# Patient Record
Sex: FEMALE | Race: WHITE | NOT HISPANIC OR LATINO | Employment: FULL TIME | URBAN - METROPOLITAN AREA
[De-identification: names, ages, dates, MRNs, and addresses within clinical notes are randomized per-mention and may not be internally consistent; named-entity substitution may affect disease eponyms.]

---

## 2024-08-26 ENCOUNTER — OFFICE VISIT (OUTPATIENT)
Dept: URGENT CARE | Facility: CLINIC | Age: 49
End: 2024-08-26
Payer: OTHER GOVERNMENT

## 2024-08-26 ENCOUNTER — PHARMACY VISIT (OUTPATIENT)
Dept: PHARMACY | Facility: MEDICAL CENTER | Age: 49
End: 2024-08-26
Payer: COMMERCIAL

## 2024-08-26 VITALS
DIASTOLIC BLOOD PRESSURE: 72 MMHG | HEIGHT: 62 IN | SYSTOLIC BLOOD PRESSURE: 122 MMHG | RESPIRATION RATE: 14 BRPM | HEART RATE: 84 BPM | OXYGEN SATURATION: 97 % | TEMPERATURE: 98.1 F | WEIGHT: 125 LBS | BODY MASS INDEX: 23 KG/M2

## 2024-08-26 DIAGNOSIS — H10.9 BACTERIAL CONJUNCTIVITIS OF BOTH EYES: Primary | ICD-10-CM

## 2024-08-26 DIAGNOSIS — B96.89 BACTERIAL CONJUNCTIVITIS OF BOTH EYES: Primary | ICD-10-CM

## 2024-08-26 PROCEDURE — 3074F SYST BP LT 130 MM HG: CPT

## 2024-08-26 PROCEDURE — 99203 OFFICE O/P NEW LOW 30 MIN: CPT

## 2024-08-26 PROCEDURE — RXMED WILLOW AMBULATORY MEDICATION CHARGE

## 2024-08-26 PROCEDURE — 3078F DIAST BP <80 MM HG: CPT

## 2024-08-26 RX ORDER — POLYMYXIN B SULFATE AND TRIMETHOPRIM 1; 10000 MG/ML; [USP'U]/ML
1 SOLUTION OPHTHALMIC EVERY 4 HOURS
Qty: 10 ML | Refills: 0 | Status: SHIPPED | OUTPATIENT
Start: 2024-08-26 | End: 2024-09-08

## 2024-08-27 NOTE — PROGRESS NOTES
Subjective:   Cheyanne Powell is a 49 y.o. female who presents for Eye Drainage (Both eyes are red, yellow drainage coming out x 1 day )          I introduced myself to the patient and informed them that I am a Family Nurse Practitioner.    HPI:Cheyanne is a 49 year-old female who comes in today c/o both eyes are  red, irritated, thick yellow mucopurulent discharge this morning and crusting of eyelashes. Onset was started 2 days ago with the right eye, woke up with left eye also affected this morning.  Patient describes symptoms as constant. They describe the pain as none. Aggravating factors include rubbing the eye. Relieving factors include bathing the eye. Treatments tried at home include none. They describe their symptoms as moderate.  Denies anybody else at home has similar symptoms, denies any contact with anyone with pinkeye. They deny wearing contact lenses.     Review of Systems   Constitutional:  Negative for chills, fever and malaise/fatigue.   HENT:  Negative for congestion and sore throat.    Eyes:  Positive for discharge and redness. Negative for blurred vision, double vision, photophobia and pain.   Respiratory:  Negative for cough and shortness of breath.    Cardiovascular:  Negative for chest pain and palpitations.   Gastrointestinal:  Negative for nausea and vomiting.   Skin:  Negative for rash.   Neurological:  Negative for dizziness and headaches.       Medications: This patient does not have an active medication from one of the medication groupers.     Allergies: Patient has no known allergies.    Problem List: does not have a problem list on file.    Surgical History:  No past surgical history on file.    Past Social Hx:        Past Family Hx:   family history is not on file.     Problem list, medications, and allergies reviewed by myself today in Epic.   I have documented what I find to be significant in regards to past medical, social, family and surgical history  in my HPI or under  "PMH/PSH/FH review section, otherwise it is noncontributory     Objective:     /72 (BP Location: Left arm, Patient Position: Sitting, BP Cuff Size: Adult)   Pulse 84   Temp 36.7 °C (98.1 °F) (Temporal)   Resp 14   Ht 1.575 m (5' 2\")   Wt 56.7 kg (125 lb)   SpO2 97%   BMI 22.86 kg/m²     During this visit, appropriate PPE was worn, and hand hygiene was performed.    Physical Exam  Vitals reviewed.   Constitutional:       General: She is not in acute distress.     Appearance: Normal appearance. She is not ill-appearing or toxic-appearing.   HENT:      Head: Normocephalic and atraumatic.      Right Ear: External ear normal.      Left Ear: External ear normal.      Nose: Nose normal.      Mouth/Throat:      Mouth: Mucous membranes are moist.   Eyes:      General: No scleral icterus.        Right eye: Discharge present.         Left eye: Discharge present.     Extraocular Movements: Extraocular movements intact.      Pupils: Pupils are equal, round, and reactive to light.      Comments: Bilateral eye exam shows no penetrative injury or foreign object noted.  There is injection and erythema of the bilateral conjunctivae present which is confined to the conjunctival layer as evidenced by gently moving the conjunctival layer with a moistened Q-tip.  There are traces of mucopurulent drainage visible with some crusting of eye lashes.  No signs of uveitis, hyphema, proptosis, or chemosis.  EOM intact without pain, no periorbital swelling or cellulitis.  Visual acuity is grossly intact. CN II - IV and CN VI grossly intact.    Cardiovascular:      Rate and Rhythm: Normal rate.   Pulmonary:      Effort: Pulmonary effort is normal.   Abdominal:      General: There is no distension.   Musculoskeletal:         General: Normal range of motion.      Cervical back: Normal range of motion. No rigidity.   Lymphadenopathy:      Cervical: No cervical adenopathy.   Skin:     General: Skin is warm and dry.   Neurological:      " General: No focal deficit present.      Mental Status: She is alert and oriented to person, place, and time. Mental status is at baseline.      Cranial Nerves: No cranial nerve deficit.   Psychiatric:         Mood and Affect: Mood normal.         Behavior: Behavior normal.         Thought Content: Thought content normal.         Judgment: Judgment normal.         Assessment/Plan:     Diagnosis and associated orders:     1. Bacterial conjunctivitis of both eyes  polymixin-trimethoprim (POLYTRIM) 26927-3.1 UNIT/ML-% Solution         Comments/MDM:     1. Bacterial conjunctivitis of both eyes    Discussed with patient that conjunctivitis can be bacterial, viral or allergic, and treatment approach is different for bacterial versus viral or allergic.  Discussed that her presentation and symptoms are consistent with bacterial conjunctivitis and this is treated with antibiotic drops.   To ease discomfort, apply a cool, clean wash cloth to your eye for 10 to 20 minutes, 3 to 4 times a day.  Gently wipe away any drainage from the eye with a warm, wet washcloth or a cotton ball.  Wash your hands often with soap and use paper towels to dry.  Do not share towels or washcloths. This may spread the infection.  Change or wash your pillowcase every day.  You should not use eye make-up until the infection is gone.  Stop using contacts lenses. Ask your eye professional how to sterilize or replace them before using again. This depends on the type of contact lenses used.  Do not touch the edge of the eyelid with the eye drop bottle or ointment tube when applying medications to the affected eye. This will stop you from spreading the infection to the other eye or to others.  Report any problems that may be related to the prescribed medicine should they develop.   Discussed red flags and when to return to urgent care/see PCP.   Strict ER precautions discussed for any eye pain, severe headache, swelling/warmth/pain/redness of the orbit  around the eye, vision changes, fever, chills, nausea, vomiting, lethargy.    - polymixin-trimethoprim (POLYTRIM) 24946-6.1 UNIT/ML-% Solution; Administer 1 Drop into both eyes every 4 hours for 10 days.  Dispense: 10 mL; Refill: 0         Pt is clinically stable at today's acute urgent care visit. Vital signs are normal and reassuring.  No acute distress noted. Appropriate for outpatient management at this time.        I personally reviewed prior external notes and test results pertinent to today's visit.  I have independently reviewed and interpreted all diagnostics ordered during this urgent care acute visit.        Please note that this dictation was created using voice recognition software. I have made a reasonable attempt to correct obvious errors, but I expect that there are errors of grammar and possibly content that I did not discover before finalizing the note.    This note was electronically signed by Dash THAO, DALJIT, LENO, FERCHO

## 2024-08-28 ASSESSMENT — ENCOUNTER SYMPTOMS
SORE THROAT: 0
CHILLS: 0
COUGH: 0
PHOTOPHOBIA: 0
VOMITING: 0
NAUSEA: 0
BLURRED VISION: 0
EYE REDNESS: 1
FEVER: 0
HEADACHES: 0
EYE DISCHARGE: 1
PALPITATIONS: 0
SHORTNESS OF BREATH: 0
DOUBLE VISION: 0
EYE PAIN: 0
DIZZINESS: 0